# Patient Record
(demographics unavailable — no encounter records)

---

## 2024-11-05 NOTE — ASSESSMENT
[FreeTextEntry1] : 47 y/o F w seropositive, on erosive RA  =pain in 1st R MCP, PIPs, hips, feet/ankles =labs 8/24 JUNIOR 1:80 speckled, , , CRP 8, ESR 55 =xrays 9/24 hands/wrists: wnl  knees; wnl  feet/ankles: wnl   Pt improved but still w activity. Will increase MTX.   Plan  -Labs to measure disease activity and monitor for drug toxicities  -increase MTX to 8 tabs weekly X 2 weeks, then 9 tabs weekly  -folic acid 1mg daily  RTO in 2 months

## 2024-11-05 NOTE — PHYSICAL EXAM
[General Appearance - Well Nourished] : well nourished [General Appearance - Well Developed] : well developed [Auscultation Breath Sounds / Voice Sounds] : lungs were clear to auscultation bilaterally [Heart Rate And Rhythm] : heart rate was normal and rhythm regular [Heart Sounds] : normal S1 and S2 [Murmurs] : no murmurs [FreeTextEntry1] : slight purple discoloration of toes; cold to touch  [Abdomen Soft] : soft [Abdomen Tenderness] : non-tender [Cervical Lymph Nodes Enlarged Posterior Bilaterally] : posterior cervical [Cervical Lymph Nodes Enlarged Anterior Bilaterally] : anterior cervical [Supraclavicular Lymph Nodes Enlarged Bilaterally] : supraclavicular [Musculoskeletal - Swelling] : no joint swelling seen [] : no rash [Skin Lesions] : no skin lesions [Oriented To Time, Place, And Person] : oriented to person, place, and time

## 2024-11-05 NOTE — HISTORY OF PRESENT ILLNESS
[___ Month(s) Ago] : [unfilled] month(s) ago [FreeTextEntry1] : =pt taking MTX 7 tabs weekly w folic acid 1mg daily =pt feels her symptoms have greatly improved, but still has some pain, stiffness, swelling of fingers. Has pain in L shoulder. =no fevers, SOB, CP, abdominal pain, n/v/d, rashes

## 2025-01-21 NOTE — ASSESSMENT
[FreeTextEntry1] : 45 y/o F w seropositive, on erosive RA  =pain in 1st R MCP, PIPs, hips, feet/ankles =labs 8/24 JUNIOR 1:80 speckled, , , CRP 8, ESR 55 =xrays 9/24 hands/wrists: wnl  knees; wnl  feet/ankles: wnl   Offered pt to add TNF Inhibitor, but pt refused. Will attempt to maximize MTX to control pt's RA.   Plan  -Labs to measure disease activity and monitor for drug toxicities  -increase MTX to 25mg weekly  -folic acid 1mg daily  RTO in 2-3 months

## 2025-01-21 NOTE — HISTORY OF PRESENT ILLNESS
[___ Month(s) Ago] : [unfilled] month(s) ago [FreeTextEntry1] : =pt taking MTX 8 tabs weekly w folic acid 1 mg daily  =pt feels pain, stiffness, swelling of fingers  =pt has am symptoms  =no fevers, SOB, CP, abdominal pain, n/v/d, rashes

## 2025-04-22 NOTE — ASSESSMENT
[FreeTextEntry1] : 45 y/o F w seropositive, on erosive RA  =pain in 1st R MCP, PIPs, hips, feet/ankles =labs 8/24 JUNIOR 1:80 speckled, , , CRP 8, ESR 55 =xrays 9/24 hands/wrists: wnl  knees; wnl  feet/ankles: wnl   Pt likely has active synovitis, but refusing any further change in tx. Discussed extensively of complications of uncontrolled RA, including joint deformity, disability. Explained that untreated RA can lead to cardiovascular complications, including MI, CAD. Explained that RA can extend to other organs if left untreated, including lung, eyes.   Will c/w MTX as pt desires.   Plan  -Labs to measure disease activity and monitor for drug toxicities  -increase MTX to 25mg weekly (split dose)  -folic acid 1mg daily  -prevnar 20 given today   RTO in 2-3 months

## 2025-04-22 NOTE — PHYSICAL EXAM
[General Appearance - Well Nourished] : well nourished [General Appearance - Well Developed] : well developed [Auscultation Breath Sounds / Voice Sounds] : lungs were clear to auscultation bilaterally [Heart Rate And Rhythm] : heart rate was normal and rhythm regular [Heart Sounds] : normal S1 and S2 [Murmurs] : no murmurs [Abdomen Soft] : soft [Abdomen Tenderness] : non-tender [Cervical Lymph Nodes Enlarged Posterior Bilaterally] : posterior cervical [Cervical Lymph Nodes Enlarged Anterior Bilaterally] : anterior cervical [Supraclavicular Lymph Nodes Enlarged Bilaterally] : supraclavicular [Musculoskeletal - Swelling] : no joint swelling seen [] : no rash [Skin Lesions] : no skin lesions [Oriented To Time, Place, And Person] : oriented to person, place, and time [FreeTextEntry1] : mild 2nd, 3rd MCP prominence

## 2025-04-22 NOTE — PROCEDURE
[Today's Date:] : Date: [unfilled] [Patient] : the patient [Risks] : risks [Benefits] : benefits [Consent Obtained] : written consent was obtained prior to the procedure and is detailed in the patient's record [Therapeutic] : therapeutic [#1 Site: ______] : #1 site identified in the [unfilled] [Alcohol] : alcohol [22 gauge 1.5 inch] : A 22 gauge 1.5 inch needle was used [Tolerated Well] : the patient tolerated the procedure well [No Complications] : there were no complications [Instructions Given] : handouts/patient instructions were given to patient [Patient Instructed to Call] : patient was instructed to call if redness at site, a decrease in range of motion or an increase in pain is noted after procedure. [de-identified] : prevnar 0.5 ml

## 2025-04-22 NOTE — HISTORY OF PRESENT ILLNESS
[___ Month(s) Ago] : [unfilled] month(s) ago [FreeTextEntry1] : =pt taking MTX 10 tabs weekly (split dose) w folic acid 1mg daily =pt says medication has helped, but still has some pain, stiffness =no fevers, SOB, CP, abdominal pain, n/v/d, rashes,